# Patient Record
Sex: FEMALE | URBAN - METROPOLITAN AREA
[De-identification: names, ages, dates, MRNs, and addresses within clinical notes are randomized per-mention and may not be internally consistent; named-entity substitution may affect disease eponyms.]

---

## 2019-07-10 ENCOUNTER — OFFICE VISIT (OUTPATIENT)
Dept: URBAN - METROPOLITAN AREA CLINIC 23 | Facility: CLINIC | Age: 59
End: 2019-07-10
Payer: COMMERCIAL

## 2019-07-10 DIAGNOSIS — S05.02XA CORNEAL ABRASION W/O FB OF LEFT EYE, INITIAL ENCOUNTER: Primary | ICD-10-CM

## 2019-07-10 PROCEDURE — 99213 OFFICE O/P EST LOW 20 MIN: CPT | Performed by: OPTOMETRIST

## 2019-07-10 RX ORDER — OFLOXACIN 3 MG/ML
0.3 % SOLUTION/ DROPS OPHTHALMIC
Qty: 1 | Refills: 1 | Status: ACTIVE
Start: 2019-07-10

## 2019-07-10 NOTE — IMPRESSION/PLAN
Impression: Corneal abrasion w/o FB of left eye, initial encounter: S05. 02XA. OS. Plan: Discussed diagnosis in detail with patient. Emphasized and explained compliance. Will continue to observe condition and or symptoms. New medication(s) Rx given today. Ofloxacin QID OS x 1 week. Patient dilated with 1 gtt atropine 1% in office. Bandage cl applied. -.50 acuvue oasys 8.4 bc. Patient will return in 24 hrs to have cl removed and f/u.

## 2019-07-11 ENCOUNTER — OFFICE VISIT (OUTPATIENT)
Dept: URBAN - METROPOLITAN AREA CLINIC 23 | Facility: CLINIC | Age: 59
End: 2019-07-11
Payer: COMMERCIAL

## 2019-07-11 PROCEDURE — 99213 OFFICE O/P EST LOW 20 MIN: CPT | Performed by: OPTOMETRIST

## 2019-07-11 NOTE — IMPRESSION/PLAN
Impression: Corneal abrasion w/o FB of left eye, subsequent encounter: S05. 02XD. OS. Plan: Discussed diagnosis in detail with patient. Emphasized and explained compliance. Will continue to observe condition and or symptoms. Continue Ofloxacin QID OS x 1 week. Replaced bandage cl applied. -.50 acuvue oasys 8.4 bc. Patient will return in 24 hrs to have cl removed and f/u.

## 2019-07-15 ENCOUNTER — OFFICE VISIT (OUTPATIENT)
Dept: URBAN - METROPOLITAN AREA CLINIC 23 | Facility: CLINIC | Age: 59
End: 2019-07-15
Payer: COMMERCIAL

## 2019-07-15 DIAGNOSIS — S05.02XD CORNEAL ABRASION W/O FB OF LEFT EYE, SUBSEQUENT ENCOUNTER: Primary | ICD-10-CM

## 2019-07-15 PROCEDURE — 99213 OFFICE O/P EST LOW 20 MIN: CPT | Performed by: OPTOMETRIST

## 2019-07-15 NOTE — IMPRESSION/PLAN
Impression: Corneal abrasion w/o FB of left eye, subsequent encounter: S05. 02XD. OS. Plan: Discussed diagnosis in detail with patient. Emphasized and explained compliance. Will continue to observe condition and or symptoms. Continue Ofloxacin QID OS x 1 week. Removed bandage cl. Patient advised to use AT's and Genteal Gel as directed.